# Patient Record
Sex: MALE | Race: WHITE | NOT HISPANIC OR LATINO | Employment: FULL TIME | URBAN - METROPOLITAN AREA
[De-identification: names, ages, dates, MRNs, and addresses within clinical notes are randomized per-mention and may not be internally consistent; named-entity substitution may affect disease eponyms.]

---

## 2017-11-14 ENCOUNTER — ALLSCRIPTS OFFICE VISIT (OUTPATIENT)
Dept: OTHER | Facility: OTHER | Age: 32
End: 2017-11-14

## 2018-01-12 NOTE — RESULT NOTES
Message  PPD placed 11/14/17  PPD read 11/16/17  -0-mm negative        Plan  Health Maintenance    · PPD    Signatures   Electronically signed by : Annalee Vann, [facility] Corey Lester;  Nov 16 2017  1:31PM EST                       (Author)

## 2018-01-13 VITALS
WEIGHT: 292 LBS | DIASTOLIC BLOOD PRESSURE: 80 MMHG | SYSTOLIC BLOOD PRESSURE: 132 MMHG | HEART RATE: 76 BPM | RESPIRATION RATE: 16 BRPM | TEMPERATURE: 98.1 F | BODY MASS INDEX: 41.8 KG/M2 | HEIGHT: 70 IN

## 2018-01-24 NOTE — PROGRESS NOTES
Assessment   1  Encounter for preventive health examination (V70 0) (Z00 00)  2  Physical examination of employee (V70 5) (Z02 89)    Plan  Health Maintenance    · Administered: PPD    Discussion/Summary    PPD skin test applied  Physical form completed  Discussed proper nutrition and regular exercise and weight loss  Patient to return to the office in 48-72 hours to read the PPD skin test    The treatment plan was reviewed with the patient/guardian  The patient/guardian understands and agrees with the treatment plan      Chief Complaint  Physical      History of Present Illness  HM, Adult Female: The patient is being seen for a health maintenance evaluation  General Health: The patient's health since the last visit is described as good  She has regular dental visits  She denies vision problems  She denies hearing loss  Immunizations status: not up to date  Lifestyle:  She consumes a diverse and healthy diet  She has weight concerns  She does not exercise regularly  She does not use tobacco  She consumes alcohol  She reports occasional alcohol use  She denies drug use  Screening:   HPI: Patient is a 26-year-old male who is a new patient to our practice being seen at his initial visit to establish care  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   Patient recently moved to this area from [place] 3 days ago  Patient has been feeling well overall without complaints  He admits to seasonal allergy symptoms treated with Zyrtec and history of asthma as a child  No regular exercise program  Past medical history reviewed with patient on the form        Family History  Father    · Family history of H/O heart surgery  Maternal Aunt    · Family history of breast cancer (V16 3) (Z80 3)  Family History    · Denied: Family history of colon cancer   · Denied: Family history of prostate cancer    Social History    · Caffeine use (V49 89) (F15 90)   · Coffee   · Cigar smoker (305 1) (F17 290)   · Denied: History of use of substance for non-medical purpose   · Minimum alcohol consumption   · Never a smoker   · Part-time employment   · Substitute     Current Meds  1  Zyrtec 10 MG TABS; Therapy: (Recorded:14Nov2017) to Recorded    Allergies   1  Sulfa Drugs    Vitals   Recorded: [id number] 12:54PM Recorded: [id number] 12:21PM   Temperature  98 1 F   Heart Rate 76    Respiration 16    Systolic 425    Diastolic 80    Height  5 ft 10 in   Weight  292 lb    BMI Calculated  41 9   BSA Calculated  2 45     Procedure    Procedure:1  Visual Acuity Test1   Indication:1  routine screening1   Results:1  20/10 in both eyes without corrective device1 , 20/15 in the right eye without corrective device1 , 20/10 in the left eye without corrective device1  Normal in both eyes1   Normal red and green1         1 Amended By: Nargis Braxton;  Nov 16 2017 1:28 PM EST    Signatures   Electronically signed by : Jayla Matthews DO; Nov 16 2017  1:49PM EST                       (Author)

## 2018-08-20 ENCOUNTER — CLINICAL SUPPORT (OUTPATIENT)
Dept: FAMILY MEDICINE CLINIC | Facility: CLINIC | Age: 33
End: 2018-08-20
Payer: COMMERCIAL

## 2018-08-20 DIAGNOSIS — Z11.1 ENCOUNTER FOR PPD TEST: Primary | ICD-10-CM

## 2018-08-20 PROCEDURE — 86580 TB INTRADERMAL TEST: CPT

## 2018-08-22 ENCOUNTER — CLINICAL SUPPORT (OUTPATIENT)
Dept: FAMILY MEDICINE CLINIC | Facility: CLINIC | Age: 33
End: 2018-08-22

## 2018-08-22 DIAGNOSIS — Z11.1 ENCOUNTER FOR PPD SKIN TEST READING: Primary | ICD-10-CM

## 2018-08-22 LAB
INDURATION: NORMAL MM
TB SKIN TEST: NEGATIVE